# Patient Record
Sex: FEMALE | Race: WHITE | Employment: PART TIME | URBAN - METROPOLITAN AREA
[De-identification: names, ages, dates, MRNs, and addresses within clinical notes are randomized per-mention and may not be internally consistent; named-entity substitution may affect disease eponyms.]

---

## 2019-01-10 ENCOUNTER — OFFICE VISIT (OUTPATIENT)
Dept: FAMILY MEDICINE CLINIC | Age: 39
End: 2019-01-10
Payer: COMMERCIAL

## 2019-01-10 VITALS
WEIGHT: 158.4 LBS | DIASTOLIC BLOOD PRESSURE: 74 MMHG | HEIGHT: 68 IN | HEART RATE: 60 BPM | BODY MASS INDEX: 24.01 KG/M2 | SYSTOLIC BLOOD PRESSURE: 108 MMHG | OXYGEN SATURATION: 97 %

## 2019-01-10 DIAGNOSIS — Z00.00 ANNUAL PHYSICAL EXAM: Primary | ICD-10-CM

## 2019-01-10 PROCEDURE — 99385 PREV VISIT NEW AGE 18-39: CPT | Performed by: FAMILY MEDICINE

## 2019-01-10 ASSESSMENT — PATIENT HEALTH QUESTIONNAIRE - PHQ9
1. LITTLE INTEREST OR PLEASURE IN DOING THINGS: 0
SUM OF ALL RESPONSES TO PHQ9 QUESTIONS 1 & 2: 0
SUM OF ALL RESPONSES TO PHQ QUESTIONS 1-9: 0
2. FEELING DOWN, DEPRESSED OR HOPELESS: 0
SUM OF ALL RESPONSES TO PHQ QUESTIONS 1-9: 0

## 2019-01-10 ASSESSMENT — ENCOUNTER SYMPTOMS
RHINORRHEA: 0
BLOOD IN STOOL: 0
SINUS PRESSURE: 0
SHORTNESS OF BREATH: 0
CHEST TIGHTNESS: 0
EYE PAIN: 0
COLOR CHANGE: 0
DIARRHEA: 0
NAUSEA: 0
WHEEZING: 0
EYE REDNESS: 0
ABDOMINAL PAIN: 0
VOMITING: 0
COUGH: 0
CONSTIPATION: 0
EYE DISCHARGE: 0

## 2019-03-07 ENCOUNTER — NURSE ONLY (OUTPATIENT)
Dept: FAMILY MEDICINE CLINIC | Age: 39
End: 2019-03-07
Payer: COMMERCIAL

## 2019-03-07 DIAGNOSIS — D64.9 ANEMIA, UNSPECIFIED TYPE: Primary | ICD-10-CM

## 2019-03-07 DIAGNOSIS — Z00.00 ANNUAL PHYSICAL EXAM: ICD-10-CM

## 2019-03-07 LAB
A/G RATIO: 1.7 (ref 1.1–2.2)
ALBUMIN SERPL-MCNC: 4.3 G/DL (ref 3.4–5)
ALP BLD-CCNC: 58 U/L (ref 40–129)
ALT SERPL-CCNC: 13 U/L (ref 10–40)
ANION GAP SERPL CALCULATED.3IONS-SCNC: 12 MMOL/L (ref 3–16)
AST SERPL-CCNC: 18 U/L (ref 15–37)
BASOPHILS ABSOLUTE: 0.1 K/UL (ref 0–0.2)
BASOPHILS RELATIVE PERCENT: 2.3 %
BILIRUB SERPL-MCNC: 0.3 MG/DL (ref 0–1)
BUN BLDV-MCNC: 13 MG/DL (ref 7–20)
CALCIUM SERPL-MCNC: 8.8 MG/DL (ref 8.3–10.6)
CHLORIDE BLD-SCNC: 104 MMOL/L (ref 99–110)
CHOLESTEROL, TOTAL: 133 MG/DL (ref 0–199)
CO2: 25 MMOL/L (ref 21–32)
CREAT SERPL-MCNC: 0.7 MG/DL (ref 0.6–1.1)
EOSINOPHILS ABSOLUTE: 0.1 K/UL (ref 0–0.6)
EOSINOPHILS RELATIVE PERCENT: 2.5 %
GFR AFRICAN AMERICAN: >60
GFR NON-AFRICAN AMERICAN: >60
GLOBULIN: 2.5 G/DL
GLUCOSE BLD-MCNC: 79 MG/DL (ref 70–99)
HCT VFR BLD CALC: 29.3 % (ref 36–48)
HDLC SERPL-MCNC: 52 MG/DL (ref 40–60)
HEMOGLOBIN: 9.4 G/DL (ref 12–16)
LDL CHOLESTEROL CALCULATED: 71 MG/DL
LYMPHOCYTES ABSOLUTE: 1.6 K/UL (ref 1–5.1)
LYMPHOCYTES RELATIVE PERCENT: 31 %
MCH RBC QN AUTO: 26.4 PG (ref 26–34)
MCHC RBC AUTO-ENTMCNC: 32.2 G/DL (ref 31–36)
MCV RBC AUTO: 81.7 FL (ref 80–100)
MONOCYTES ABSOLUTE: 0.5 K/UL (ref 0–1.3)
MONOCYTES RELATIVE PERCENT: 9.2 %
NEUTROPHILS ABSOLUTE: 2.8 K/UL (ref 1.7–7.7)
NEUTROPHILS RELATIVE PERCENT: 55 %
PDW BLD-RTO: 13.6 % (ref 12.4–15.4)
PLATELET # BLD: 356 K/UL (ref 135–450)
PMV BLD AUTO: 8.8 FL (ref 5–10.5)
POTASSIUM SERPL-SCNC: 4 MMOL/L (ref 3.5–5.1)
RBC # BLD: 3.58 M/UL (ref 4–5.2)
SODIUM BLD-SCNC: 141 MMOL/L (ref 136–145)
TOTAL PROTEIN: 6.8 G/DL (ref 6.4–8.2)
TRIGL SERPL-MCNC: 51 MG/DL (ref 0–150)
TSH REFLEX: 3.49 UIU/ML (ref 0.27–4.2)
VLDLC SERPL CALC-MCNC: 10 MG/DL
WBC # BLD: 5 K/UL (ref 4–11)

## 2019-03-07 PROCEDURE — 36415 COLL VENOUS BLD VENIPUNCTURE: CPT | Performed by: FAMILY MEDICINE

## 2019-03-08 LAB
FERRITIN: 4.2 NG/ML (ref 15–150)
IRON SATURATION: 7 % (ref 15–50)
IRON: 27 UG/DL (ref 37–145)
TOTAL IRON BINDING CAPACITY: 371 UG/DL (ref 260–445)

## 2019-03-14 ENCOUNTER — OFFICE VISIT (OUTPATIENT)
Dept: FAMILY MEDICINE CLINIC | Age: 39
End: 2019-03-14
Payer: COMMERCIAL

## 2019-03-14 VITALS
HEIGHT: 68 IN | OXYGEN SATURATION: 98 % | DIASTOLIC BLOOD PRESSURE: 62 MMHG | SYSTOLIC BLOOD PRESSURE: 120 MMHG | WEIGHT: 158.6 LBS | BODY MASS INDEX: 24.04 KG/M2 | HEART RATE: 65 BPM

## 2019-03-14 DIAGNOSIS — N92.0 MENORRHAGIA WITH REGULAR CYCLE: ICD-10-CM

## 2019-03-14 DIAGNOSIS — D50.9 IRON DEFICIENCY ANEMIA, UNSPECIFIED IRON DEFICIENCY ANEMIA TYPE: Primary | ICD-10-CM

## 2019-03-14 PROCEDURE — 99213 OFFICE O/P EST LOW 20 MIN: CPT | Performed by: FAMILY MEDICINE

## 2019-03-14 RX ORDER — LANOLIN ALCOHOL/MO/W.PET/CERES
325 CREAM (GRAM) TOPICAL
Qty: 180 TABLET | Refills: 1 | Status: SHIPPED | OUTPATIENT
Start: 2019-03-14 | End: 2020-11-17

## 2019-06-18 ENCOUNTER — NURSE ONLY (OUTPATIENT)
Dept: FAMILY MEDICINE CLINIC | Age: 39
End: 2019-06-18
Payer: COMMERCIAL

## 2019-06-18 DIAGNOSIS — D50.9 IRON DEFICIENCY ANEMIA, UNSPECIFIED IRON DEFICIENCY ANEMIA TYPE: Primary | ICD-10-CM

## 2019-06-18 LAB
BASOPHILS ABSOLUTE: 0.1 K/UL (ref 0–0.2)
BASOPHILS RELATIVE PERCENT: 0.9 %
EOSINOPHILS ABSOLUTE: 0.1 K/UL (ref 0–0.6)
EOSINOPHILS RELATIVE PERCENT: 1.3 %
FERRITIN: 25.4 NG/ML (ref 15–150)
HCT VFR BLD CALC: 36.7 % (ref 36–48)
HEMOGLOBIN: 12.1 G/DL (ref 12–16)
IRON SATURATION: 30 % (ref 15–50)
IRON: 120 UG/DL (ref 37–145)
LYMPHOCYTES ABSOLUTE: 2.1 K/UL (ref 1–5.1)
LYMPHOCYTES RELATIVE PERCENT: 23 %
MCH RBC QN AUTO: 28 PG (ref 26–34)
MCHC RBC AUTO-ENTMCNC: 33.1 G/DL (ref 31–36)
MCV RBC AUTO: 84.5 FL (ref 80–100)
MONOCYTES ABSOLUTE: 0.6 K/UL (ref 0–1.3)
MONOCYTES RELATIVE PERCENT: 6.1 %
NEUTROPHILS ABSOLUTE: 6.3 K/UL (ref 1.7–7.7)
NEUTROPHILS RELATIVE PERCENT: 68.7 %
PDW BLD-RTO: 14.8 % (ref 12.4–15.4)
PLATELET # BLD: 359 K/UL (ref 135–450)
PMV BLD AUTO: 9.4 FL (ref 5–10.5)
RBC # BLD: 4.34 M/UL (ref 4–5.2)
TOTAL IRON BINDING CAPACITY: 398 UG/DL (ref 260–445)
WBC # BLD: 9.1 K/UL (ref 4–11)

## 2019-06-18 PROCEDURE — 36415 COLL VENOUS BLD VENIPUNCTURE: CPT | Performed by: FAMILY MEDICINE

## 2019-06-24 NOTE — RESULT ENCOUNTER NOTE
Patient was advised of results and voiced understanding. Advised to continue taking the iron at same dose. She wanted to let Dr. Bruce Underwood know that she saw Dr. Kristin Pruitt her OB/GYN and he too was concerned with her deficiency so he did an US to check and this came back normal so he started her on a birthcontrol to help regulate her cycles and hopefully they will not be as heavy.

## 2020-11-07 ENCOUNTER — APPOINTMENT (OUTPATIENT)
Dept: GENERAL RADIOLOGY | Age: 40
End: 2020-11-07
Payer: COMMERCIAL

## 2020-11-07 ENCOUNTER — HOSPITAL ENCOUNTER (EMERGENCY)
Age: 40
Discharge: HOME | End: 2020-11-07
Payer: COMMERCIAL

## 2020-11-07 VITALS
OXYGEN SATURATION: 99 % | HEART RATE: 55 BPM | SYSTOLIC BLOOD PRESSURE: 108 MMHG | WEIGHT: 155 LBS | RESPIRATION RATE: 15 BRPM | TEMPERATURE: 98.3 F | DIASTOLIC BLOOD PRESSURE: 65 MMHG | BODY MASS INDEX: 23.35 KG/M2

## 2020-11-07 PROCEDURE — 6370000000 HC RX 637 (ALT 250 FOR IP): Performed by: NURSE PRACTITIONER

## 2020-11-07 PROCEDURE — 99283 EMERGENCY DEPT VISIT LOW MDM: CPT

## 2020-11-07 PROCEDURE — 90715 TDAP VACCINE 7 YRS/> IM: CPT | Performed by: NURSE PRACTITIONER

## 2020-11-07 PROCEDURE — 90471 IMMUNIZATION ADMIN: CPT | Performed by: NURSE PRACTITIONER

## 2020-11-07 PROCEDURE — 73130 X-RAY EXAM OF HAND: CPT

## 2020-11-07 PROCEDURE — 73562 X-RAY EXAM OF KNEE 3: CPT

## 2020-11-07 PROCEDURE — 12002 RPR S/N/AX/GEN/TRNK2.6-7.5CM: CPT

## 2020-11-07 PROCEDURE — 6360000002 HC RX W HCPCS: Performed by: NURSE PRACTITIONER

## 2020-11-07 RX ORDER — TRAMADOL HYDROCHLORIDE 50 MG/1
50 TABLET ORAL ONCE
Status: COMPLETED | OUTPATIENT
Start: 2020-11-07 | End: 2020-11-07

## 2020-11-07 RX ADMIN — TETANUS TOXOID, REDUCED DIPHTHERIA TOXOID AND ACELLULAR PERTUSSIS VACCINE, ADSORBED 0.5 ML: 5; 2.5; 8; 8; 2.5 SUSPENSION INTRAMUSCULAR at 09:58

## 2020-11-07 RX ADMIN — TRAMADOL HYDROCHLORIDE 50 MG: 50 TABLET ORAL at 09:58

## 2020-11-07 ASSESSMENT — PAIN DESCRIPTION - LOCATION: LOCATION: KNEE;FINGER (COMMENT WHICH ONE)

## 2020-11-07 ASSESSMENT — PAIN SCALES - GENERAL
PAINLEVEL_OUTOF10: 8
PAINLEVEL_OUTOF10: 8

## 2020-11-07 ASSESSMENT — PAIN DESCRIPTION - PAIN TYPE: TYPE: ACUTE PAIN

## 2020-11-07 NOTE — ED NOTES
Wound care performed on hand, knee using sterile techinque. Wounds cleaned with NS, 4x4 and hibiclens. Hand wounds stopped bleeding prior to arrival, will apply antibiotic ointment to hand/ knee after Vone stitches knee up.       [facility]  11/07/20 1004

## 2020-11-07 NOTE — ED NOTES
Completed wound care to pt's right knee. Applied Polysporin, Adaptic, gauze 4 by4 pads, gauze stretch wrap, and 2 inch Coban to effected leg. Pt tolerated well and acknowledged all care taking instructions.      Emery Jones  11/07/20 0285

## 2020-11-07 NOTE — ED PROVIDER NOTES
[facility] Emergency Department    CHIEF COMPLAINT  Fall (FELL WHILE JOGGING); Hand Injury; and Knee Pain      SHARED SERVICE VISIT:  Evaluated by MINDI. My supervising physician was available for consultation. HISTORY OF PRESENT ILLNESS  Nasra Goss is a 35 y.o. female who presents to the ED complaining of mechanical /trip and fall while jogging with injuries to her RRF and right knee. Pain in her RRF is \"throbbing\" 6/10 and her right knee pain is \"stinging\" 6/10. There is a laceration on her right knee. She denies the use of blood thinners, HI or LOC. She was able to get up and ambulate immediately following fall. She ambulated into the department. Unsure of tetanus status. No other complaints, modifying factors or associated symptoms. Nursing notes reviewed. Past Medical History:   Diagnosis Date    Broken arm 08/2011    broke left arm at elbow; tripped over chair at pool    History of shingles 04/2018     Past Surgical History:   Procedure Laterality Date    APPENDECTOMY  1999     Family History   Problem Relation Age of Onset    Cancer Father         prostate    Heart Disease Maternal Grandmother     Diabetes Maternal Grandfather     Heart Disease Maternal Grandfather     Cancer Paternal Grandfather         ? ? type     Social History     Socioeconomic History    Marital status:      Spouse name: Not on file    Number of children: Not on file    Years of education: Not on file    Highest education level: Not on file   Occupational History    Not on file   Social Needs    Financial resource strain: Not on file    Food insecurity     Worry: Not on file     Inability: Not on file    Transportation needs     Medical: Not on file     Non-medical: Not on file   Tobacco Use    Smoking status: Never Smoker    Smokeless tobacco: Never Used   Substance and Sexual Activity    Alcohol use: Yes     Comment: occ    Drug use: No    Sexual activity: Yes Partners: Male   Lifestyle    Physical activity     Days per week: Not on file     Minutes per session: Not on file    Stress: Not on file   Relationships    Social connections     Talks on phone: Not on file     Gets together: Not on file     Attends [employer] service: Not on file     Active member of club or organization: Not on file     Attends meetings of clubs or organizations: Not on file     Relationship status: Not on file    Intimate partner violence     Fear of current or ex partner: Not on file     Emotionally abused: Not on file     Physically abused: Not on file     Forced sexual activity: Not on file   Other Topics Concern    Not on file   Social History Narrative    Not on file     No current facility-administered medications for this encounter. Current Outpatient Medications   Medication Sig Dispense Refill    Probiotic Product (PROBIOTIC DAILY) CAPS Take by mouth daily (with breakfast)      multivitamin (THERAGRAN) per tablet Take 1 tablet by mouth daily.  ferrous sulfate 325 (65 Fe) MG EC tablet Take 1 tablet by mouth 3 times daily (with meals) 180 tablet 1     Allergies   Allergen Reactions    Ceclor [Cefaclor] Hives       REVIEW OF SYSTEMS  6 systems reviewed, pertinent positives per HPI otherwise noted to be negative    PHYSICAL EXAM  /65   Pulse 55   Temp 98.3 °F (36.8 °C) (Oral)   Resp 15   Wt 155 lb (70.3 kg)   SpO2 99%   BMI 23.35 kg/m²   GENERAL APPEARANCE: Awake and alert. Cooperative. No acute distress. HEAD: Normocephalic. Atraumatic. No Foster's sign. EYES: PERRL. EOM's grossly intact. ENT: Mucous membranes are moist.   NECK: Supple. Normal ROM. There is no cervical spine tenderness upon palpation. CHEST: Equal symmetric chest rise. LUNGS: Breathing is unlabored. Speaking comfortably in full sentences. Abdomen: Nondistended  Back: There is no thoracic or lumbar spine tenderness upon palpation. EXTREMITIES: MAEE. No acute deformities.  + full ROM of right knee with > 90 degrees of flexion and full extension. There is no crepitus. + DP and PT pulses equal bilaterally. + brisk cap refill < 2 secs. + abrasion noted to dorsum of  bilateral hands. + edema noted to RRF. + full ROM noted to digit. + equal radial pulses bilaterally. No obvious deformity noted to RRF or to right knee. Bilateral upper and lower extremities are warm, pink, dry-well perfused. SKIN: Warm and dry.  + 3.5 cm chevron shaped laceration noted to skin of anterior aspect of right knee. NEUROLOGICAL: Alert and oriented. Strength is 5/5 in all extremities and sensation is intact. RADIOLOGY  Xr Hand Right (min 3 Views)    Result Date: 11/7/2020  EXAMINATION: THREE XRAY VIEWS OF THE RIGHT HAND 11/7/2020 9:41 am COMPARISON: None. HISTORY: ORDERING SYSTEM PROVIDED HISTORY: [facility] fall with injury TECHNOLOGIST PROVIDED HISTORY: Reason for exam:->[facility] fall with injury Reason for Exam:  fell when jogging - pain in right index finger and right patella Acuity: Acute Type of Exam: Initial FINDINGS: No acute fracture or dislocation. No significant arthritic changes. No focal soft tissue abnormality. No acute osseous abnormality of the right hand. Xr Knee Right (3 Views)    Result Date: 11/7/2020  EXAMINATION: THREE XRAY VIEWS OF THE RIGHT KNEE 11/7/2020 9:41 am COMPARISON: None. HISTORY: ORDERING SYSTEM PROVIDED HISTORY: [facility] fall c injury TECHNOLOGIST PROVIDED HISTORY: Reason for exam:->[facility] fall c injury Reason for Exam:  fell when jogging - pain in right index finger and right patella Acuity: Acute Type of Exam: Initial FINDINGS: No acute fracture or dislocation. Joint spaces are preserved with no significant arthritic changes. No joint effusion or focal soft tissue abnormality. No acute osseous abnormality of the right knee. ED COURSE   Patient received tramadol for pain, with good relief. A dressing was applied to right knee.     PROCEDURE:  LACERATION REPAIR  Yusra GONZALES Ruddy or their surrogate had an opportunity to ask questions, and the risks, benefits, and alternatives were discussed. The wound was prepped and draped to maintain a sterile field. A local anesthetic was used to completely anesthetize the wound. It was copiously irrigated. It was explored to its depth in a bloodless field with no sign of tendon, nerve, or vascular injury. No foreign bodies were identified. The 3.5 cm wound was closed with 4-0 ethilon sutures x 7. There were no complications during the procedure. A discussion was had with Mrs. Fox and her  regarding tetanus, laceration of right knee, and contusion of right ring finger. Risk management discussed and shared decision making had with patient and/or surrogate. All questions were answered. Patient will follow up with her PCP for further evaluation/treatment. Patient will return to ED for new/worsening symptoms. Patient was not sent home with prescriptions. MDM  Patient presents to the emergency department with laceration to right knee and contusion of right finger. Considered fracture dislocation of right ring finger but less likely as no radiographic evidence of fracture/dislocation identified by xray. Considered fracture/dislocation of right patella but less likely as there is no radiographic evidence of fracture/dislocation of right patella. Imaging was obtained of patient's hand/finger and knee to rule out fracture/dislocation. There were no acute osseous findings. Considered tendon involvement of right patella but less likely as there is full ROM/flexion and extension of RLE. Tetanus provided as patient is unsure of tetanus status. Patient is instructed to followup with her PCP in the next 7-10 days for suture removal. Return precautions given for s/s of infection. Patient is agreeable with plan for discharge and follow-up. DISPOSITION  Patient was discharged to home in good condition.             Joan Decker APRN - [facility]  11/08/20 1560 [facility], APRN - [facility]  11/10/20 0115

## 2020-11-17 ENCOUNTER — OFFICE VISIT (OUTPATIENT)
Dept: FAMILY MEDICINE CLINIC | Age: 40
End: 2020-11-17
Payer: COMMERCIAL

## 2020-11-17 VITALS
HEIGHT: 68 IN | WEIGHT: 165.2 LBS | DIASTOLIC BLOOD PRESSURE: 68 MMHG | HEART RATE: 62 BPM | TEMPERATURE: 97.2 F | OXYGEN SATURATION: 99 % | BODY MASS INDEX: 25.04 KG/M2 | SYSTOLIC BLOOD PRESSURE: 118 MMHG

## 2020-11-17 PROCEDURE — 99213 OFFICE O/P EST LOW 20 MIN: CPT | Performed by: FAMILY MEDICINE

## 2020-11-17 ASSESSMENT — PATIENT HEALTH QUESTIONNAIRE - PHQ9
SUM OF ALL RESPONSES TO PHQ9 QUESTIONS 1 & 2: 0
SUM OF ALL RESPONSES TO PHQ QUESTIONS 1-9: 0
2. FEELING DOWN, DEPRESSED OR HOPELESS: 0
SUM OF ALL RESPONSES TO PHQ QUESTIONS 1-9: 0
1. LITTLE INTEREST OR PLEASURE IN DOING THINGS: 0
SUM OF ALL RESPONSES TO PHQ QUESTIONS 1-9: 0

## 2020-11-17 NOTE — PROGRESS NOTES
Subjective:      Patient ID: Chere Bamberger is a 38 y.o. female. HPI  Chief Complaint   Patient presents with    Follow-Up from Hospital     Patient is here for an ER follow up, was seen at [facility] ED on 11/7/20 and treated for a fall where she received stitches.  Suture / Staple Removal     Stitches are located in her right knee. Here for follow-up from emergency room. Had a fall while running. Alonza Maribell forward onto her right knee and right hand. Had a bleeding laceration on kneecap which was sutured. Has been keeping clean and looks like it is healing well. States finger is still swollen and sore but she has full range of motion  Chere Bamberger is a 38 y.o. female with the following history as [facility] in [facility]:  Patient Active Problem List    Diagnosis Date Noted    Menorrhagia with regular cycle 03/14/2019    Iron deficiency anemia 03/14/2019     Current Outpatient Medications   Medication Sig Dispense Refill    Probiotic Product (PROBIOTIC DAILY) CAPS Take by mouth daily (with breakfast)      multivitamin (THERAGRAN) per tablet Take 1 tablet by mouth daily. No current facility-administered medications for this visit. Allergies: Ceclor [cefaclor]  Past Medical History:   Diagnosis Date    Broken arm 08/2011    broke left arm at elbow; tripped over chair at pool    History of shingles 04/2018     Past Surgical History:   Procedure Laterality Date    APPENDECTOMY  1999     Family History   Problem Relation Age of Onset    Cancer Father         prostate    Heart Disease Maternal Grandmother     Diabetes Maternal Grandfather     Heart Disease Maternal Grandfather     Cancer Paternal Grandfather         ? ? type     Social History     Tobacco Use    Smoking status: Never Smoker    Smokeless tobacco: Never Used   Substance Use Topics    Alcohol use: Yes     Comment: occ     Vitals:    11/17/20 1128   BP: 118/68   Pulse: 62   Temp: 97.2 °F (36.2 °C)   TempSrc: Temporal   SpO2: 99%   Weight: 165 lb 3.2 oz (74.9 kg)   Height: 5' 8.31\" (1.735 m)     Body mass index is 24.89 kg/m². Wt Readings from Last 3 Encounters:   11/17/20 165 lb 3.2 oz (74.9 kg)   11/07/20 155 lb (70.3 kg)   03/14/19 158 lb 9.6 oz (71.9 kg)     BP Readings from Last 3 Encounters:   11/17/20 118/68   11/07/20 108/65   03/14/19 120/62      RADIOLOGY  Xr Hand Right (min 3 Views)     Result Date: 11/7/2020  EXAMINATION: THREE XRAY VIEWS OF THE RIGHT HAND 11/7/2020 9:41 am COMPARISON: None. HISTORY: ORDERING SYSTEM PROVIDED HISTORY: [facility] fall with injury TECHNOLOGIST PROVIDED HISTORY: Reason for exam:->[facility] fall with injury Reason for Exam:  fell when jogging - pain in right index finger and right patella Acuity: Acute Type of Exam: Initial FINDINGS: No acute fracture or dislocation. No significant arthritic changes. No focal soft tissue abnormality.      No acute osseous abnormality of the right hand.      Xr Knee Right (3 Views)     Result Date: 11/7/2020  EXAMINATION: THREE XRAY VIEWS OF THE RIGHT KNEE 11/7/2020 9:41 am COMPARISON: None. HISTORY: ORDERING SYSTEM PROVIDED HISTORY: [facility] fall c injury TECHNOLOGIST PROVIDED HISTORY: Reason for exam:->[facility] fall c injury Reason for Exam:  fell when jogging - pain in right index finger and right patella Acuity: Acute Type of Exam: Initial FINDINGS: No acute fracture or dislocation. Joint spaces are preserved with no significant arthritic changes. No joint effusion or focal soft tissue abnormality.      No acute osseous abnormality of the right knee.        Review of Systems   Constitutional: Negative for chills and fever. Respiratory: Negative for chest tightness and shortness of breath. Cardiovascular: Negative for chest pain and palpitations. Musculoskeletal: Positive for arthralgias and joint swelling. Skin: Positive for wound. Neurological: Negative for dizziness, syncope and headaches.        Objective:   Physical Exam  Constitutional: Appearance: Normal appearance. Cardiovascular:      Rate and Rhythm: Normal rate and regular rhythm. Pulmonary:      Effort: Pulmonary effort is normal.      Breath sounds: Normal breath sounds. Musculoskeletal:         General: Swelling and tenderness present. Comments: r hand 4th digit- swelling around PIP joint, can bend and flex   Skin:     General: Skin is warm. Comments: r knee- healed laceration with 7 sutures  No cellulitis or draiange   Neurological:      General: No focal deficit present. Mental Status: She is alert and oriented to person, place, and time. Cranial Nerves: No cranial nerve deficit. Sensory: No sensory deficit. Motor: No weakness.       Coordination: Coordination normal.         Assessment:      r knee lac  r finger strain      Plan:      Review ED record  Consult meds  Sutures removed  discussed wound care          Juarez PHILLIPS DO

## 2020-11-21 ASSESSMENT — ENCOUNTER SYMPTOMS
CHEST TIGHTNESS: 0
SHORTNESS OF BREATH: 0

## 2020-12-01 ENCOUNTER — OFFICE VISIT (OUTPATIENT)
Dept: FAMILY MEDICINE CLINIC | Age: 40
End: 2020-12-01
Payer: COMMERCIAL

## 2020-12-01 VITALS
DIASTOLIC BLOOD PRESSURE: 62 MMHG | WEIGHT: 165.2 LBS | HEIGHT: 68 IN | BODY MASS INDEX: 25.04 KG/M2 | HEART RATE: 61 BPM | TEMPERATURE: 96.5 F | SYSTOLIC BLOOD PRESSURE: 116 MMHG | OXYGEN SATURATION: 99 %

## 2020-12-01 PROBLEM — Z00.00 ANNUAL PHYSICAL EXAM: Status: ACTIVE | Noted: 2020-12-01

## 2020-12-01 LAB
A/G RATIO: 1.8 (ref 1.1–2.2)
ALBUMIN SERPL-MCNC: 4.7 G/DL (ref 3.4–5)
ALP BLD-CCNC: 76 U/L (ref 40–129)
ALT SERPL-CCNC: 19 U/L (ref 10–40)
ANION GAP SERPL CALCULATED.3IONS-SCNC: 11 MMOL/L (ref 3–16)
AST SERPL-CCNC: 23 U/L (ref 15–37)
BILIRUB SERPL-MCNC: <0.2 MG/DL (ref 0–1)
BUN BLDV-MCNC: 12 MG/DL (ref 7–20)
CALCIUM SERPL-MCNC: 9.6 MG/DL (ref 8.3–10.6)
CHLORIDE BLD-SCNC: 102 MMOL/L (ref 99–110)
CHOLESTEROL, TOTAL: 152 MG/DL (ref 0–199)
CO2: 27 MMOL/L (ref 21–32)
CREAT SERPL-MCNC: 0.6 MG/DL (ref 0.6–1.1)
GFR AFRICAN AMERICAN: >60
GFR NON-AFRICAN AMERICAN: >60
GLOBULIN: 2.6 G/DL
GLUCOSE BLD-MCNC: 79 MG/DL (ref 70–99)
HCT VFR BLD CALC: 33.9 % (ref 36–48)
HDLC SERPL-MCNC: 56 MG/DL (ref 40–60)
HEMOGLOBIN: 11.2 G/DL (ref 12–16)
LDL CHOLESTEROL CALCULATED: 82 MG/DL
MCH RBC QN AUTO: 28.2 PG (ref 26–34)
MCHC RBC AUTO-ENTMCNC: 33.2 G/DL (ref 31–36)
MCV RBC AUTO: 85.1 FL (ref 80–100)
PDW BLD-RTO: 12.5 % (ref 12.4–15.4)
PLATELET # BLD: 409 K/UL (ref 135–450)
PMV BLD AUTO: 8.6 FL (ref 5–10.5)
POTASSIUM SERPL-SCNC: 4.3 MMOL/L (ref 3.5–5.1)
RBC # BLD: 3.98 M/UL (ref 4–5.2)
SODIUM BLD-SCNC: 140 MMOL/L (ref 136–145)
TOTAL PROTEIN: 7.3 G/DL (ref 6.4–8.2)
TRIGL SERPL-MCNC: 70 MG/DL (ref 0–150)
VLDLC SERPL CALC-MCNC: 14 MG/DL
WBC # BLD: 6.3 K/UL (ref 4–11)

## 2020-12-01 PROCEDURE — 99396 PREV VISIT EST AGE 40-64: CPT | Performed by: FAMILY MEDICINE

## 2020-12-01 PROCEDURE — 36415 COLL VENOUS BLD VENIPUNCTURE: CPT | Performed by: FAMILY MEDICINE

## 2020-12-01 ASSESSMENT — ENCOUNTER SYMPTOMS
VOMITING: 0
EYE REDNESS: 0
EYE PAIN: 0
DIARRHEA: 0
EYE DISCHARGE: 0
CONSTIPATION: 0
SHORTNESS OF BREATH: 0
CHEST TIGHTNESS: 0
COLOR CHANGE: 0
WHEEZING: 0
NAUSEA: 0
SINUS PRESSURE: 0
RHINORRHEA: 0
COUGH: 0
ABDOMINAL PAIN: 0
BLOOD IN STOOL: 0

## 2020-12-01 ASSESSMENT — PATIENT HEALTH QUESTIONNAIRE - PHQ9
SUM OF ALL RESPONSES TO PHQ QUESTIONS 1-9: 0
SUM OF ALL RESPONSES TO PHQ9 QUESTIONS 1 & 2: 0
SUM OF ALL RESPONSES TO PHQ QUESTIONS 1-9: 0
2. FEELING DOWN, DEPRESSED OR HOPELESS: 0
SUM OF ALL RESPONSES TO PHQ QUESTIONS 1-9: 0
1. LITTLE INTEREST OR PLEASURE IN DOING THINGS: 0

## 2020-12-01 NOTE — PROGRESS NOTES
Subjective:      Patient ID: Garrett Hashimoto is a 38 y.o. female. HPI  Chief Complaint   Patient presents with    Annual Exam     Patient is here for a routine physical, she is fasting for blood work. Here for CPE. Non smoker. PMH unremarkable  Keeps up-to-date with dental and vision exams. Does try to exercise frequently. Denies any current problems. Denies any family history of breast or colon cancer. [address]  for her gynecological exams  Garrett Hashimoto is a 38 y.o. female with the following history as recorded in [facility]:  Patient Active Problem List    Diagnosis Date Noted    Menorrhagia with regular cycle 03/14/2019    Iron deficiency anemia 03/14/2019     Current Outpatient Medications   Medication Sig Dispense Refill    Probiotic Product (PROBIOTIC DAILY) CAPS Take by mouth daily (with breakfast)      multivitamin (THERAGRAN) per tablet Take 1 tablet by mouth daily. No current facility-administered medications for this visit. Allergies: Ceclor [cefaclor]  Past Medical History:   Diagnosis Date    Broken arm 08/2011    broke left arm at elbow; tripped over chair at pool    History of shingles 04/2018     Past Surgical History:   Procedure Laterality Date    APPENDECTOMY  1999     Family History   Problem Relation Age of Onset    Cancer Father         prostate    Heart Disease Maternal Grandmother     Diabetes Maternal Grandfather     Heart Disease Maternal Grandfather     Cancer Paternal Grandfather         ? ? type     Social History     Tobacco Use    Smoking status: Never Smoker    Smokeless tobacco: Never Used   Substance Use Topics    Alcohol use: Yes     Comment: occ     Vitals:    12/01/20 0810   BP: 116/62   Pulse: 61   Temp: 96.5 °F (35.8 °C)   TempSrc: Temporal   SpO2: 99%   Weight: 165 lb 3.2 oz (74.9 kg)   Height: 5' 8.31\" (1.735 m)     Body mass index is 24.89 kg/m².      Wt Readings from Last 3 Encounters:   12/01/20 165 lb 3.2 oz (74.9 kg)   11/17/20 165 lb 3.2 oz (74.9 kg)   11/07/20 155 lb (70.3 kg)     BP Readings from Last 3 Encounters:   12/01/20 116/62   11/17/20 118/68   11/07/20 108/65        Review of Systems   Constitutional: Negative for chills, fatigue, fever and unexpected weight change. HENT: Negative for ear discharge, ear pain, hearing loss, rhinorrhea, sinus pressure and tinnitus. Eyes: Negative for pain, discharge, redness and visual disturbance. Respiratory: Negative for cough, chest tightness, shortness of breath and wheezing. Cardiovascular: Negative for chest pain and palpitations. Gastrointestinal: Negative for abdominal pain, blood in stool, constipation, diarrhea, nausea and vomiting. Genitourinary: Negative for difficulty urinating, dysuria and hematuria. Musculoskeletal: Negative for arthralgias and myalgias. Skin: Negative for color change and rash. Neurological: Negative for dizziness, seizures, syncope and headaches. Hematological: Negative for adenopathy. Does not bruise/bleed easily. Psychiatric/Behavioral: Negative for dysphoric mood and sleep disturbance. The patient is not nervous/anxious. Objective:   Physical Exam  Constitutional:       General: She is not in acute distress. Appearance: Normal appearance. She is well-developed. HENT:      Head: Normocephalic. Right Ear: Tympanic membrane, ear canal and external ear normal.      Left Ear: Tympanic membrane, ear canal and external ear normal.      Nose: Nose normal. No congestion or rhinorrhea. Mouth/Throat:      Mouth: Mucous membranes are moist.      Pharynx: Oropharynx is clear. No oropharyngeal exudate. Eyes:      General:         Right eye: No discharge. Left eye: No discharge. Extraocular Movements: Extraocular movements intact. Conjunctiva/sclera: Conjunctivae normal.      Pupils: Pupils are equal, round, and reactive to light. Neck:      Musculoskeletal: Neck supple. No muscular tenderness.       Thyroid: No thyromegaly. Cardiovascular:      Rate and Rhythm: Normal rate and regular rhythm. Heart sounds: Normal heart sounds. No murmur. Pulmonary:      Effort: Pulmonary effort is normal.      Breath sounds: Normal breath sounds. No wheezing or rhonchi. Abdominal:      General: Bowel sounds are normal. There is no distension. Palpations: Abdomen is soft. Tenderness: There is no abdominal tenderness. There is no guarding or rebound. Musculoskeletal: Normal range of motion. General: No swelling, tenderness or deformity. Lymphadenopathy:      Cervical: No cervical adenopathy. Skin:     General: Skin is warm. Coloration: Skin is not jaundiced. Findings: No bruising, erythema or rash. Neurological:      General: No focal deficit present. Mental Status: She is alert and oriented to person, place, and time. Mental status is at baseline. Cranial Nerves: No cranial nerve deficit. Motor: No abnormal muscle tone. Coordination: Coordination normal.      Deep Tendon Reflexes: Reflexes normal.   Psychiatric:         Mood and Affect: Mood normal.         Behavior: Behavior normal.         Thought Content: Thought content normal.         Judgment: Judgment normal.         Assessment:      cpe      Plan:      Patient Counseling:  --Nutrition: Stressed importance of moderation in sodium/caffeine intake, saturated fat and cholesterol, caloric balance, sufficient intake of fresh fruits, vegetables, fiber, calcium, iron, and 1 mg of folate supplement per day (for females capable of pregnancy). --Exercise: Stressed the importance of regular exercise. --Dental health: Discussed importance of regular tooth brushing, flossing, and dental visits. --Immunizations reviewed. Daily sunscreen recommended. Yearly FSE discussed  --Discussed benefits of screening colonoscopy.     Orders Placed This Encounter   Procedures    LIPID PANEL     Order Specific Question:   Is Patient Fasting?/# of Hours     Answer:   Yordy [address]    [facility]             MAIRLU Schneider [address] [facility], DO

## 2020-12-01 NOTE — PATIENT INSTRUCTIONS

## 2020-12-31 PROBLEM — Z00.00 ANNUAL PHYSICAL EXAM: Status: RESOLVED | Noted: 2020-12-01 | Resolved: 2020-12-31

## 2021-03-11 NOTE — PROGRESS NOTES
Henry Joseph    Age 38 y.o.    female    1980    MRN [id number]    4/22/2021  Arrival Time_____________  OR Time____________[address]     Procedure(s):  DILATATION AND CURETTAGE, HYSTEROSCOPY WITH NOVASURE ENDOMETRIAL ABLATION                      General     Surgeon(s):  Harvinder Hodges, MD      DAY ADMIT ___  SDS/OP ___  OUTPT IN BED ___         Phone [phone] (home) [phone] (work)   PCP _____________________ Phone_________________ [address] ( ) Epic CE ( ) Appt ________    ADDITIONAL INFO __________________________________ Cardio/Consult _____________    NOTES _____________________________________________________________________    ____________________________________________________________________________    PAT APPT DATE:________ TIME: ________  FAXED QAD: _______  (__) H&P w/ hospitalist  ____________________________________________________________________________    COVID TEST: Date/Location______________        NURSING HISTORY COMPLETE: _______  (__) CBC       (__) W/ DIFF ___________  (__)  ECHO    __________  (__) Hgb A1C    ___________  (__) CHEST X RAY   __________  (__) LIPID PROFILE  ___________  (__) EKG   __________  (__) PT/PTT   ___________  (__) PFT's   __________  (__) BMP   ___________  (__) CAROTIDS  __________  (__) CMP   ___________  (__) VEIN MAPPING  __________  (__) U/A   ___________  (__) HISTORY & PHYSICAL __________  (__) URINE C & S  ___________  (__) CARDIAC CLEARANCE __________  (__) U/A W/ FLEX  ___________  (__) PULM.  CLEARANCE __________  (__) SERUM PREGNANCY ___________  (__) Check Epic DOS orders __________  (__) TYPE & SCREEN ________ repeat ( ) (__)  __________________ __________  (__) ALBUMIN   ___________  (__)  __________________ __________  (__) TRANSFERRIN  ___________  (__)  __________________ __________  (__) LIVER PROFILE  ___________  (__)  __________________ __________  (__) CARBOXY HGB  ___________  (__) URINE PREG DOS __________  (__) NICOTINE & MET. ___________  (__) BLOOD SUGAR DOS __________  (__) PREALBUMIN  ___________    (__) MRSA NASAL SWAB ___________  (__) BLOOD THINNERS __________  (__) ACE/ ARBS: _____________________    (__) BETABLOCKERS ___________________

## 2021-05-07 ENCOUNTER — OFFICE VISIT (OUTPATIENT)
Dept: PRIMARY CARE CLINIC | Age: 41
End: 2021-05-07
Payer: COMMERCIAL

## 2021-05-07 DIAGNOSIS — Z01.818 PREOP TESTING: Primary | ICD-10-CM

## 2021-05-07 PROCEDURE — 99211 OFF/OP EST MAY X REQ PHY/QHP: CPT | Performed by: NURSE PRACTITIONER

## 2021-05-07 NOTE — PROGRESS NOTES
Preoperative Screening for Elective Surgery/Invasive Procedures While COVID-19 present in the community     Have you had any of the following symptoms? o Fever, chills  o Cough  o Shortness of breath  o Muscle aches/pain  o Diarrhea  o Abdominal pain, nausea, vomiting  o Loss or decrease in taste and / or smell   Risk of Exposure  o Have you recently been hospitalized for COVID-19 or flu-like illness, if so when?  o Recently diagnosed with COVID-19, if so when?  o Recently tested for COVID-19, if so when?  o Have you been in close contact with a person or family member who currently has or recently had COVID-19? If yes, when and in what context?  o Do you live with anybody who in the last 14 days has had fever, chills, shortness of breath, muscle aches, flu-like illness?  o Do you have any close contacts or family members who are currently in the hospital for COVID-19 or flu-like illness? If yes, assess recent close contact with this person. Indicate if the patient has a positive screen by answering yes to one or more of the above questions. Patients who test positive or screen positive prior to surgery or on the day of surgery should be evaluated in conjunction with the surgeon/proceduralist/anesthesiologist to determine the urgency of the procedure.      No to all

## 2021-05-07 NOTE — PATIENT INSTRUCTIONS
Advance Care Planning  People with COVID-19 may have no symptoms, mild symptoms, such as fever, cough, and shortness of breath or they may have more severe illness, developing severe and fatal pneumonia. As a result, Advance Care Planning with attention to naming a health care decision maker (someone you trust to make healthcare decisions for you if you could not speak for yourself) and sharing other health care preferences is important BEFORE a possible health crisis. Please contact your Primary Care Provider to discuss Advance Care Planning. Preventing the Spread of Coronavirus Disease 2019 in Homes and Residential Communities  For the most recent information go to [email or web address]    Prevention steps for People with confirmed or suspected COVID-19 (including persons under investigation) who do not need to be hospitalized  and   People with confirmed COVID-19 who were hospitalized and determined to be medically stable to go home    Your healthcare provider and public health staff will evaluate whether you can be cared for at home. If it is determined that you do not need to be hospitalized and can be isolated at home, you will be monitored by staff from your local or state health department. You should follow the prevention steps below until a healthcare provider or local or state health department says you can return to your normal activities. Stay home except to get medical care  People who are mildly ill with COVID-19 are able to isolate at home during their illness. You should restrict activities outside your home, except for getting medical care. Do not go to work, school, or public areas. Avoid using public transportation, ride-sharing, or taxis. Separate yourself from other people and animals in your home  People: As much as possible, you should stay in a specific room and away from other people in your home.  Also, you should use a separate before eating or preparing food. If soap and water are not readily available, use an alcohol-based hand  with at least 60% alcohol, covering all surfaces of your hands and rubbing them together until they feel dry. Soap and water are the best option if hands are visibly dirty. Avoid touching your eyes, nose, and mouth with unwashed hands. Avoid sharing personal household items  You should not share dishes, drinking glasses, cups, eating utensils, towels, or bedding with other people or pets in your home. After using these items, they should be washed thoroughly with soap and water. Clean all high-touch surfaces everyday  High touch surfaces include counters, tabletops, doorknobs, bathroom fixtures, toilets, phones, keyboards, tablets, and bedside tables. Also, clean any surfaces that may have blood, stool, or body fluids on them. Use a household cleaning spray or wipe, according to the label instructions. Labels contain instructions for safe and effective use of the cleaning product including precautions you should take when applying the product, such as wearing gloves and making sure you have good ventilation during use of the product. Monitor your symptoms  Seek prompt medical attention if your illness is worsening (e.g., difficulty breathing). Before seeking care, call your healthcare provider and tell them that you have, or are being evaluated for, COVID-19. Put on a facemask before you enter the facility. These steps will help the healthcare providers office to keep other people in the office or waiting room from getting infected or exposed. Ask your healthcare provider to call the local or state health department. Persons who are placed under active monitoring or facilitated self-monitoring should follow instructions provided by their local health department or occupational health professionals, as appropriate. When working with your local health department check their available hours.   If you have a medical emergency and need to call 911, notify the dispatch personnel that you have, or are being evaluated for COVID-19. If possible, put on a facemask before emergency medical services arrive. Discontinuing home isolation  Patients with confirmed COVID-19 should remain under home isolation precautions until the risk of secondary transmission to others is thought to be low. The decision to discontinue home isolation precautions should be made on a case-by-case basis, in consultation with healthcare providers and state and local health departments.

## 2021-05-07 NOTE — PROGRESS NOTES
Daryl Carrasco received a viral test for COVID-19. They were educated on isolation and quarantine as appropriate. For any symptoms, they were directed to seek care from their PCP, given contact information to establish with a doctor, directed to an urgent care or the emergency room.

## 2021-05-08 LAB — SARS-COV-2: NOT DETECTED

## 2021-05-13 ENCOUNTER — ANESTHESIA (OUTPATIENT)
Dept: OPERATING ROOM | Age: 41
End: 2021-05-13
Payer: COMMERCIAL

## 2021-05-13 ENCOUNTER — ANESTHESIA EVENT (OUTPATIENT)
Dept: OPERATING ROOM | Age: 41
End: 2021-05-13
Payer: COMMERCIAL

## 2021-05-13 ENCOUNTER — HOSPITAL ENCOUNTER (OUTPATIENT)
Age: 41
Setting detail: OUTPATIENT SURGERY
Discharge: HOME | End: 2021-05-13
Attending: OBSTETRICS & GYNECOLOGY | Admitting: OBSTETRICS & GYNECOLOGY
Payer: COMMERCIAL

## 2021-05-13 VITALS
WEIGHT: 163.4 LBS | HEART RATE: 53 BPM | BODY MASS INDEX: 24.77 KG/M2 | DIASTOLIC BLOOD PRESSURE: 63 MMHG | HEIGHT: 68 IN | SYSTOLIC BLOOD PRESSURE: 114 MMHG | OXYGEN SATURATION: 100 % | RESPIRATION RATE: 16 BRPM | TEMPERATURE: 97.8 F

## 2021-05-13 VITALS
TEMPERATURE: 98.6 F | RESPIRATION RATE: 5 BRPM | SYSTOLIC BLOOD PRESSURE: 98 MMHG | OXYGEN SATURATION: 100 % | DIASTOLIC BLOOD PRESSURE: 53 MMHG

## 2021-05-13 DIAGNOSIS — Z98.890 S/P ENDOMETRIAL ABLATION: Primary | ICD-10-CM

## 2021-05-13 DIAGNOSIS — N92.0 MENORRHAGIA WITH REGULAR CYCLE: ICD-10-CM

## 2021-05-13 LAB — PREGNANCY, URINE: NEGATIVE

## 2021-05-13 PROCEDURE — 6360000002 HC RX W HCPCS: Performed by: OBSTETRICS & GYNECOLOGY

## 2021-05-13 PROCEDURE — 3600000004 HC SURGERY LEVEL 4 BASE: Performed by: OBSTETRICS & GYNECOLOGY

## 2021-05-13 PROCEDURE — 7100000000 HC PACU RECOVERY - FIRST 15 MIN: Performed by: OBSTETRICS & GYNECOLOGY

## 2021-05-13 PROCEDURE — 7100000010 HC PHASE II RECOVERY - FIRST 15 MIN: Performed by: OBSTETRICS & GYNECOLOGY

## 2021-05-13 PROCEDURE — 2580000003 HC RX 258: Performed by: ANESTHESIOLOGY

## 2021-05-13 PROCEDURE — 3700000001 HC ADD 15 MINUTES (ANESTHESIA): Performed by: OBSTETRICS & GYNECOLOGY

## 2021-05-13 PROCEDURE — 6360000002 HC RX W HCPCS: Performed by: NURSE ANESTHETIST, CERTIFIED REGISTERED

## 2021-05-13 PROCEDURE — 6360000002 HC RX W HCPCS: Performed by: ANESTHESIOLOGY

## 2021-05-13 PROCEDURE — 2720000010 HC SURG SUPPLY STERILE: Performed by: OBSTETRICS & GYNECOLOGY

## 2021-05-13 PROCEDURE — 3600000014 HC SURGERY LEVEL 4 ADDTL 15MIN: Performed by: OBSTETRICS & GYNECOLOGY

## 2021-05-13 PROCEDURE — 2709999900 HC NON-CHARGEABLE SUPPLY: Performed by: OBSTETRICS & GYNECOLOGY

## 2021-05-13 PROCEDURE — 88305 TISSUE EXAM BY PATHOLOGIST: CPT

## 2021-05-13 PROCEDURE — 2500000003 HC RX 250 WO HCPCS: Performed by: OBSTETRICS & GYNECOLOGY

## 2021-05-13 PROCEDURE — 7100000011 HC PHASE II RECOVERY - ADDTL 15 MIN: Performed by: OBSTETRICS & GYNECOLOGY

## 2021-05-13 PROCEDURE — 3700000000 HC ANESTHESIA ATTENDED CARE: Performed by: OBSTETRICS & GYNECOLOGY

## 2021-05-13 PROCEDURE — 84703 CHORIONIC GONADOTROPIN ASSAY: CPT

## 2021-05-13 PROCEDURE — 7100000001 HC PACU RECOVERY - ADDTL 15 MIN: Performed by: OBSTETRICS & GYNECOLOGY

## 2021-05-13 PROCEDURE — 2500000003 HC RX 250 WO HCPCS: Performed by: NURSE ANESTHETIST, CERTIFIED REGISTERED

## 2021-05-13 RX ORDER — KETOROLAC TROMETHAMINE 30 MG/ML
INJECTION, SOLUTION INTRAMUSCULAR; INTRAVENOUS PRN
Status: DISCONTINUED | OUTPATIENT
Start: 2021-05-13 | End: 2021-05-13 | Stop reason: SDUPTHER

## 2021-05-13 RX ORDER — FENTANYL CITRATE 50 UG/ML
INJECTION, SOLUTION INTRAMUSCULAR; INTRAVENOUS PRN
Status: DISCONTINUED | OUTPATIENT
Start: 2021-05-13 | End: 2021-05-13 | Stop reason: SDUPTHER

## 2021-05-13 RX ORDER — LEVOFLOXACIN 5 MG/ML
500 INJECTION, SOLUTION INTRAVENOUS ONCE
Status: COMPLETED | OUTPATIENT
Start: 2021-05-13 | End: 2021-05-13

## 2021-05-13 RX ORDER — HYDROCODONE BITARTRATE AND ACETAMINOPHEN 5; 325 MG/1; MG/1
1 TABLET ORAL EVERY 6 HOURS PRN
Qty: 10 TABLET | Refills: 0 | Status: SHIPPED | OUTPATIENT
Start: 2021-05-13 | End: 2021-05-16

## 2021-05-13 RX ORDER — ROCURONIUM BROMIDE 10 MG/ML
INJECTION, SOLUTION INTRAVENOUS PRN
Status: DISCONTINUED | OUTPATIENT
Start: 2021-05-13 | End: 2021-05-13 | Stop reason: SDUPTHER

## 2021-05-13 RX ORDER — MEPERIDINE HYDROCHLORIDE 50 MG/ML
12.5 INJECTION INTRAMUSCULAR; INTRAVENOUS; SUBCUTANEOUS EVERY 5 MIN PRN
Status: DISCONTINUED | OUTPATIENT
Start: 2021-05-13 | End: 2021-05-13 | Stop reason: HOSPADM

## 2021-05-13 RX ORDER — SODIUM CHLORIDE 9 MG/ML
25 INJECTION, SOLUTION INTRAVENOUS PRN
Status: DISCONTINUED | OUTPATIENT
Start: 2021-05-13 | End: 2021-05-13 | Stop reason: HOSPADM

## 2021-05-13 RX ORDER — ONDANSETRON 2 MG/ML
4 INJECTION INTRAMUSCULAR; INTRAVENOUS EVERY 30 MIN PRN
Status: DISCONTINUED | OUTPATIENT
Start: 2021-05-13 | End: 2021-05-13 | Stop reason: HOSPADM

## 2021-05-13 RX ORDER — SODIUM CHLORIDE, SODIUM LACTATE, POTASSIUM CHLORIDE, CALCIUM CHLORIDE 600; 310; 30; 20 MG/100ML; MG/100ML; MG/100ML; MG/100ML
INJECTION, SOLUTION INTRAVENOUS CONTINUOUS
Status: DISCONTINUED | OUTPATIENT
Start: 2021-05-13 | End: 2021-05-13 | Stop reason: HOSPADM

## 2021-05-13 RX ORDER — SODIUM CHLORIDE 0.9 % (FLUSH) 0.9 %
5-40 SYRINGE (ML) INJECTION PRN
Status: DISCONTINUED | OUTPATIENT
Start: 2021-05-13 | End: 2021-05-13 | Stop reason: HOSPADM

## 2021-05-13 RX ORDER — MIDAZOLAM HYDROCHLORIDE 1 MG/ML
INJECTION INTRAMUSCULAR; INTRAVENOUS PRN
Status: DISCONTINUED | OUTPATIENT
Start: 2021-05-13 | End: 2021-05-13 | Stop reason: SDUPTHER

## 2021-05-13 RX ORDER — DIPHENHYDRAMINE HYDROCHLORIDE 50 MG/ML
6.25 INJECTION INTRAMUSCULAR; INTRAVENOUS
Status: DISCONTINUED | OUTPATIENT
Start: 2021-05-13 | End: 2021-05-13 | Stop reason: HOSPADM

## 2021-05-13 RX ORDER — LIDOCAINE HYDROCHLORIDE 10 MG/ML
0.3 INJECTION, SOLUTION EPIDURAL; INFILTRATION; INTRACAUDAL; PERINEURAL
Status: DISCONTINUED | OUTPATIENT
Start: 2021-05-13 | End: 2021-05-13 | Stop reason: HOSPADM

## 2021-05-13 RX ORDER — LABETALOL HYDROCHLORIDE 5 MG/ML
5 INJECTION, SOLUTION INTRAVENOUS
Status: DISCONTINUED | OUTPATIENT
Start: 2021-05-13 | End: 2021-05-13 | Stop reason: HOSPADM

## 2021-05-13 RX ORDER — OXYCODONE HYDROCHLORIDE AND ACETAMINOPHEN 5; 325 MG/1; MG/1
1 TABLET ORAL PRN
Status: DISCONTINUED | OUTPATIENT
Start: 2021-05-13 | End: 2021-05-13 | Stop reason: HOSPADM

## 2021-05-13 RX ORDER — PROPOFOL 10 MG/ML
INJECTION, EMULSION INTRAVENOUS PRN
Status: DISCONTINUED | OUTPATIENT
Start: 2021-05-13 | End: 2021-05-13 | Stop reason: SDUPTHER

## 2021-05-13 RX ORDER — HYDRALAZINE HYDROCHLORIDE 20 MG/ML
5 INJECTION INTRAMUSCULAR; INTRAVENOUS EVERY 30 MIN PRN
Status: DISCONTINUED | OUTPATIENT
Start: 2021-05-13 | End: 2021-05-13 | Stop reason: HOSPADM

## 2021-05-13 RX ORDER — SODIUM CHLORIDE 0.9 % (FLUSH) 0.9 %
5-40 SYRINGE (ML) INJECTION EVERY 12 HOURS SCHEDULED
Status: DISCONTINUED | OUTPATIENT
Start: 2021-05-13 | End: 2021-05-13 | Stop reason: HOSPADM

## 2021-05-13 RX ORDER — OXYCODONE HYDROCHLORIDE AND ACETAMINOPHEN 5; 325 MG/1; MG/1
2 TABLET ORAL PRN
Status: DISCONTINUED | OUTPATIENT
Start: 2021-05-13 | End: 2021-05-13 | Stop reason: HOSPADM

## 2021-05-13 RX ORDER — IBUPROFEN 800 MG/1
800 TABLET ORAL EVERY 6 HOURS PRN
Qty: 30 TABLET | Refills: 1 | Status: SHIPPED | OUTPATIENT
Start: 2021-05-13

## 2021-05-13 RX ORDER — LIDOCAINE HYDROCHLORIDE 20 MG/ML
INJECTION, SOLUTION INFILTRATION; PERINEURAL PRN
Status: DISCONTINUED | OUTPATIENT
Start: 2021-05-13 | End: 2021-05-13 | Stop reason: SDUPTHER

## 2021-05-13 RX ADMIN — LIDOCAINE HYDROCHLORIDE 5 ML: 20 INJECTION, SOLUTION INFILTRATION; PERINEURAL at 12:43

## 2021-05-13 RX ADMIN — SODIUM CHLORIDE, SODIUM LACTATE, POTASSIUM CHLORIDE, AND CALCIUM CHLORIDE: .6; .31; .03; .02 INJECTION, SOLUTION INTRAVENOUS at 13:23

## 2021-05-13 RX ADMIN — KETOROLAC TROMETHAMINE 30 MG: 30 INJECTION, SOLUTION INTRAMUSCULAR; INTRAVENOUS at 13:18

## 2021-05-13 RX ADMIN — FENTANYL CITRATE 50 MCG: 50 INJECTION INTRAMUSCULAR; INTRAVENOUS at 12:49

## 2021-05-13 RX ADMIN — LEVOFLOXACIN 500 MG: 5 INJECTION, SOLUTION INTRAVENOUS at 12:47

## 2021-05-13 RX ADMIN — MIDAZOLAM HYDROCHLORIDE 2 MG: 2 INJECTION, SOLUTION INTRAMUSCULAR; INTRAVENOUS at 12:38

## 2021-05-13 RX ADMIN — HYDROMORPHONE HYDROCHLORIDE 0.5 MG: 1 INJECTION, SOLUTION INTRAMUSCULAR; INTRAVENOUS; SUBCUTANEOUS at 14:01

## 2021-05-13 RX ADMIN — METRONIDAZOLE 500 MG: 500 INJECTION, SOLUTION INTRAVENOUS at 12:36

## 2021-05-13 RX ADMIN — ROCURONIUM BROMIDE 15 MG: 10 SOLUTION INTRAVENOUS at 12:43

## 2021-05-13 RX ADMIN — SODIUM CHLORIDE, SODIUM LACTATE, POTASSIUM CHLORIDE, AND CALCIUM CHLORIDE: .6; .31; .03; .02 INJECTION, SOLUTION INTRAVENOUS at 11:38

## 2021-05-13 RX ADMIN — PROPOFOL 200 MG: 10 INJECTION, EMULSION INTRAVENOUS at 12:43

## 2021-05-13 RX ADMIN — FENTANYL CITRATE 50 MCG: 50 INJECTION INTRAMUSCULAR; INTRAVENOUS at 13:09

## 2021-05-13 ASSESSMENT — PULMONARY FUNCTION TESTS
PIF_VALUE: 1
PIF_VALUE: 11
PIF_VALUE: 11
PIF_VALUE: 12
PIF_VALUE: 10
PIF_VALUE: 6
PIF_VALUE: 6
PIF_VALUE: 12
PIF_VALUE: 11
PIF_VALUE: 12
PIF_VALUE: 11
PIF_VALUE: 5
PIF_VALUE: 1
PIF_VALUE: 11
PIF_VALUE: 1
PIF_VALUE: 6
PIF_VALUE: 11
PIF_VALUE: 3
PIF_VALUE: 0
PIF_VALUE: 11
PIF_VALUE: 11
PIF_VALUE: 0
PIF_VALUE: 11

## 2021-05-13 ASSESSMENT — PAIN DESCRIPTION - PAIN TYPE
TYPE: SURGICAL PAIN
TYPE: SURGICAL PAIN

## 2021-05-13 ASSESSMENT — PAIN DESCRIPTION - LOCATION: LOCATION: ABDOMEN

## 2021-05-13 ASSESSMENT — PAIN DESCRIPTION - DESCRIPTORS: DESCRIPTORS: CRAMPING

## 2021-05-13 ASSESSMENT — PAIN SCALES - GENERAL
PAINLEVEL_OUTOF10: 7
PAINLEVEL_OUTOF10: 0

## 2021-05-13 ASSESSMENT — PAIN - FUNCTIONAL ASSESSMENT: PAIN_FUNCTIONAL_ASSESSMENT: 0-10

## 2021-05-13 NOTE — PROGRESS NOTES
Discharge instructions reviewed with patient and , verbalizes understanding. Patient up ambulating and able to void at this time.  SL removed and dressing placed/

## 2021-05-13 NOTE — OP NOTE
Operative Note      Patient: Shilpa Lewis  YOB: 1980  MRN: [id number]    Date of Procedure: 5/13/2021    Pre-Op Diagnosis: MENORRHAGIA    Post-Op Diagnosis: Same and endometrial polyp       Procedure(s):  DILATATION AND CURETTAGE, HYSTEROSCOPY WITH NOVASURE ENDOMETRIAL ABLATION    Surgeon(s):  Lynsey Bob MD    Assistant:   Surgical Assistant: Sebastian Tony    Anesthesia: General    Estimated Blood Loss (mL): less than 384     Complications: None    Specimens:   ID Type Source Tests Collected by Time Destination   A : endocervical polyp Tissue Tissue SURGICAL PATHOLOGY Lynsey Bob MD 5/13/2021 1301    B : endometrial curettings Tissue Tissue SURGICAL PATHOLOGY Lynsey Bob MD 5/13/2021 1305        Implants:  * No implants in log *      Drains: * No LDAs found *    Findings: 1 cm endometrial polyp; menstrual type endometrium    Detailed Description of Procedure:   See dictated note    Electronically signed by Dontae King MD on 5/13/2021 at 1:29 PM

## 2021-05-13 NOTE — H&P
H&P reviewed and patient examined, no changes noted. I have reviewed the risks, benefits and alternatives to the procedure.   Sanam Anguiano

## 2021-05-13 NOTE — ANESTHESIA PRE PROCEDURE
Department of Anesthesiology  Preprocedure Note       Name:  Jacquelin Mayberry   Age:  38 y.o.  :  1980                                          MRN:  [id number]         Date:  2021      Surgeon: Devaughn Golden):  Uche Johnson MD    Procedure: Procedure(s):  DILATATION AND CURETTAGE, HYSTEROSCOPY WITH NOVASURE ENDOMETRIAL ABLATION    Medications prior to admission:   Prior to Admission medications    Medication Sig Start Date End Date Taking? Authorizing Provider   Ferrous Sulfate (IRON PO) Take by mouth   Yes Historical Provider, MD   Probiotic Product (PROBIOTIC DAILY) CAPS Take by mouth daily (with breakfast)   Yes Historical Provider, MD   multivitamin (THERAGRAN) per tablet Take 1 tablet by mouth daily. Yes Historical Provider, MD       Current medications:    Current Facility-Administered Medications   Medication Dose Route Frequency Provider Last Rate Last Admin    metronidazole (FLAGYL) 500 mg in NaCl 100 mL IVPB premix  500 mg Intravenous Once Uche Johnson MD        levoFLOXacin (LEVAQUIN) 500 MG/100ML infusion 500 mg  500 mg Intravenous Once Uche Johnson MD        lactated ringers infusion   Intravenous Continuous Nando Ortega MD        sodium chloride flush 0.9 % injection 5-40 mL  5-40 mL Intravenous 2 times per day Nando Ortega MD        sodium chloride flush 0.9 % injection 5-40 mL  5-40 mL Intravenous PRN Nando Ortega MD        0.9 % sodium chloride infusion  25 mL Intravenous PRN Nando Ortega MD        lidocaine PF 1 % injection 0.3 mL  0.3 mL Intradermal Once PRN Nando Ortega MD           Allergies:     Allergies   Allergen Reactions    Ceclor [Cefaclor] Hives       Problem List:    Patient Active Problem List   Diagnosis Code    Menorrhagia with regular cycle N92.0    Iron deficiency anemia D50.9       Past Medical History:        Diagnosis Date    Broken arm 2011    broke left arm at elbow; tripped over chair at pool    History of shingles 2018 Past Surgical History:        Procedure Laterality Date    APPENDECTOMY  1999       Social History:    Social History     Tobacco Use    Smoking status: Never Smoker    Smokeless tobacco: Never Used   Substance Use Topics    Alcohol use: Yes     Comment: occ                                Counseling given: Not Answered      Vital Signs (Current):   Vitals:    05/07/21 1007 05/13/21 1120 05/13/21 1144   BP:   115/64   Pulse:   64   Resp:   16   Temp:   98.5 °F (36.9 °C)   TempSrc:   Temporal   SpO2:   100%   Weight: 160 lb (72.6 kg) 163 lb 6.4 oz (74.1 kg)    Height: 5' 8\" (1.727 m) 5' 8\" (1.727 m)                                               BP Readings from Last 3 Encounters:   05/13/21 115/64   12/01/20 116/62   11/17/20 118/68       NPO Status: Time of last liquid consumption: 2130                        Time of last solid consumption: 2130                        Date of last liquid consumption: 05/12/21                        Date of last solid food consumption: 05/12/21    BMI:   Wt Readings from Last 3 Encounters:   05/13/21 163 lb 6.4 oz (74.1 kg)   12/01/20 165 lb 3.2 oz (74.9 kg)   11/17/20 165 lb 3.2 oz (74.9 kg)     Body mass index is 24.84 kg/m².     CBC:   Lab Results   Component Value Date    WBC 6.3 12/01/2020    RBC 3.98 12/01/2020    HGB 11.2 12/01/2020    HCT 33.9 12/01/2020    MCV 85.1 12/01/2020    RDW 12.5 12/01/2020     12/01/2020       CMP:   Lab Results   Component Value Date     12/01/2020    K 4.3 12/01/2020     12/01/2020    CO2 27 12/01/2020    BUN 12 12/01/2020    CREATININE 0.6 12/01/2020    GFRAA >60 12/01/2020    GFRAA >60 02/12/2010    AGRATIO 1.8 12/01/2020    LABGLOM >60 12/01/2020    GLUCOSE 79 12/01/2020    PROT 7.3 12/01/2020    PROT 7.7 02/12/2010    CALCIUM 9.6 12/01/2020    BILITOT <0.2 12/01/2020    ALKPHOS 76 12/01/2020    AST 23 12/01/2020    ALT 19 12/01/2020       POC Tests: No results for input(s): POCGLU, POCNA, POCK, POCCL, POCBUN, POCHEMO, POCHCT in the last 72 hours. Coags: No results found for: PROTIME, INR, APTT    HCG (If Applicable):   Lab Results   Component Value Date    PREGTESTUR Negative 05/13/2021        ABGs: No results found for: PHART, PO2ART, MAX5QRZ, DRZ9UOJ, BEART, B2XXCDSQ     Type & Screen (If Applicable):  No results found for: LABABO, LABRH    Drug/Infectious Status (If Applicable):  No results found for: HIV, HEPCAB    COVID-19 Screening (If Applicable):   Lab Results   Component Value Date    COVID19 Not Detected 05/07/2021           Anesthesia Evaluation  Patient summary reviewed and Nursing notes reviewed no history of anesthetic complications:   Airway: Mallampati: II     Neck ROM: full   Dental:          Pulmonary:                              Cardiovascular:                      Neuro/Psych:               GI/Hepatic/Renal:             Endo/Other:                     Abdominal:           Vascular:                                        Anesthesia Plan      general     ASA 1     (Medications & allergies reviewed  All available lab & EKG data reviewed)  Induction: intravenous. Anesthetic plan and risks discussed with patient. Plan discussed with CRNA.                   Alcon Kathleen MD   5/13/2021

## 2021-05-13 NOTE — ANESTHESIA POSTPROCEDURE EVALUATION
Department of Anesthesiology  Postprocedure Note    Patient: Ba Finnegan  MRN: [id number]  YOB: 1980  Date of evaluation: 5/13/2021  Time:  6:07 PM     Procedure Summary     Date: 05/13/21 Room / Location: [address] [place] [address]    Anesthesia Start: 7027 Anesthesia Stop: 9540    Procedure: DILATATION AND CURETTAGE, HYSTEROSCOPY WITH NOVASURE ENDOMETRIAL ABLATION (N/A Vagina ) Diagnosis:       Menorrhagia with regular cycle      (MENORRHAGIA)    Surgeons: Dre Maher MD Responsible Provider: Shreyas Caceres MD    Anesthesia Type: general ASA Status: 1          Anesthesia Type: general    Floridalma Phase I: Floridalma Score: 9    Floridalma Phase II: Floridalma Score: 10    Last vitals: Reviewed and per EMR flowsheets.        Anesthesia Post Evaluation    Comments: Postoperative Anesthesia Note    Name:    Ba Finnegan  MRN:      [id number]    Patient Vitals in the past 12 hrs:  05/13/21 1452, BP:114/63, Temp:97.8 °F (36.6 °C), Temp src:Temporal, Pulse:53, Resp:16, SpO2:100 %  05/13/21 1445, BP:(!) 113/52, Pulse:(!) 48, Resp:12, SpO2:99 %  05/13/21 1440, BP:115/63, Pulse:(!) 49, Resp:12, SpO2:99 %  05/13/21 1435, BP:(!) 117/49, Pulse:(!) 49, Resp:12, SpO2:98 %  05/13/21 1430, BP:114/62, Pulse:(!) 49, Resp:12, SpO2:98 %  05/13/21 1425, BP:113/61, Pulse:59, Resp:18, SpO2:95 %  05/13/21 1420, BP:(!) 112/53, Pulse:(!) 47, Resp:12, SpO2:98 %  05/13/21 1415, BP:(!) 113/58, Pulse:50, Resp:12, SpO2:99 %  05/13/21 1405, BP:112/62, Pulse:52, Resp:10, SpO2:100 %  05/13/21 1400, BP:(!) 115/50, Pulse:56, Resp:13, SpO2:96 %  05/13/21 1355, BP:(!) 133/49, Pulse:56, Resp:11, SpO2:99 %  05/13/21 1350, BP:(!) 116/57, Pulse:59, Resp:11, SpO2:96 %  05/13/21 1345, BP:(!) 115/58, Pulse:55, Resp:12, SpO2:100 %  05/13/21 1343, Temp:97.1 °F (36.2 °C), Temp src:Temporal  05/13/21 1144, BP:115/64, Temp:98.5 °F (36.9 °C), Temp src:Temporal, Pulse:64, Resp:16, SpO2:100 %  05/13/21 1120, Height:5' 8\" (1.727 m), Weight:163 lb 6.4 oz (74.1 kg)     LABS:    CBC  Lab Results       Component                Value               Date/Time                  WBC                      6.3                 12/01/2020 08:31 AM        HGB                      11.2 (L)            12/01/2020 08:31 AM        HCT                      33.9 (L)            12/01/2020 08:31 AM        PLT                      409                 12/01/2020 08:31 AM   RENAL  Lab Results       Component                Value               Date/Time                  NA                       140                 12/01/2020 08:31 AM        K                        4.3                 12/01/2020 08:31 AM        CL                       102                 12/01/2020 08:31 AM        CO2                      27                  12/01/2020 08:31 AM        BUN                      12                  12/01/2020 08:31 AM        CREATININE               0.6                 12/01/2020 08:31 AM        GLUCOSE                  79                  12/01/2020 08:31 AM   COAGS  No results found for: PROTIME, INR, APTT    Intake & Output: In: 5897 (P.O.:25; I.V.:1200)  Out: 75 (Urine:50)    Nausea & Vomiting:  No    Level of Consciousness:  Awake    Pain Assessment:  Adequate analgesia    Anesthesia Complications:  No apparent anesthetic complications    SUMMARY      Vital signs stable  OK to discharge from Stage I post anesthesia care.   Care transferred from Anesthesiology department on discharge from perioperative area

## 2021-05-13 NOTE — PROGRESS NOTES
Patient ready to prepare for discharge. Bedside report and transfer of care given to Alee VARELA. Anabel Miner

## 2021-05-13 NOTE — PROGRESS NOTES
Patient transferred to [facility] to prepare for discharge.  brought back to room. Patient eating a light snack.

## 2021-05-13 NOTE — PROGRESS NOTES
Patient resting in stretcher with safety features in place and call light in reach.   brought to bedside

## 2021-05-14 NOTE — OP NOTE
[facility]                 Maryanne Tam                                OPERATIVE REPORT    PATIENT NAME: Author Cross                  :        1980  MED REC NO:   [id number]                          ROOM:  ACCOUNT NO:   [de-identified]                           ADMIT DATE: 2021  PROVIDER:     Quintin Spaulding MD    DATE OF PROCEDURE:  2021    PREOPERATIVE DIAGNOSES:  Menorrhagia with regular cycle. POSTOPERATIVE DIAGNOSES:  Menorrhagia with regular cycle with  endometrial polyp. OPERATION PERFORMED:  Dilation and curettage, hysteroscopy with NovaSure  endometrial ablation. SURGEON:  Quintin Spaulding MD    ANESTHESIA:  General with LMA. INDICATIONS AND CONSENT FOR PROCEDURE:  The patient is a 69-year-old   3, para 2, AB 1 female, who presented in 2021 for her yearly  exam.  She complained at that point of heavy menstrual periods that were  regular with small clots. She had taken birth control pills until this  the summer of 2020 for cycle control. However, when she stopped, the  heavy periods resumed. She was interested in definitive therapy with  endometrial ablation. She underwent a Pap smear, which returned  unsatisfactory initially due to obscuring blood. She had a followup Pap  in 2021, which was negative. She then had an endometrial biopsy  done, which showed a benign endometrial polyp. She was aware of risks  of surgery including anesthesia, infection, injury to bowel or urinary  tract, hemorrhage requiring transfusion with risk of HIV or hepatitis  and possible need for further surgery. She understood these risks and  signed informed consent. DESCRIPTION OF PROCEDURE:  With the patient under satisfactory general  anesthesia, she was placed in the dorsal lithotomy position in the  candy-cane stirrups. A careful examination under anesthesia was  performed revealing no abnormalities.   The patient was on her menstrual  period. The vagina and perineum were prepped and draped in the usual  sterile fashion. A catheter was used to empty the bladder. Erickson  retractors were placed anteriorly and posteriorly in the vaginal vault  and the cervix was grasped along its anterior lip with a single tooth  tenaculum. Some small endocervical polyps were noted initially and  these were removed with a polyp forceps. Next, the uterus was sounded  and then the cervical canal was dilated with a series of sequential  Hegar dilators until a #7 Hegar could be introduced into the uterine  cavity. The cavity length was then calculated to be 5 cm. A 0 degree  hysteroscope was then introduced in the uterine cavity and using  lactated Ringer's as the distending medium, pan hysteroscopy was  performed. There was an endometrial polyp present in the lower uterine  segment on the right-hand side. It appeared to be benign. Both tubal  ostia were identified. Next, the NovaSure instrument was introduced in  the uterine cavity. The cavity width was then calculated to be 3.6 cm  resulting in a power setting of 99 garcia. Cavity assessment was passed  and then the treatment cycle was begun and this lasted for 1 minute and  11 seconds. The NovaSure was withdrawn, inspected, and found to be  intact. The hysteroscope was reinserted and the polyp was still  present. Polyp forceps were used to remove this. The tenaculum was  removed. There is some bleeding from both tenaculum sites, so two  sutures of 0 Vicryl were then placed to control this. The patient was  then awakened and extubated, and taken to the recovery room in  satisfactory condition. All sponge, needle, and instrument counts were  correct. There were no complications. Estimated blood loss was less  than 50 mL and fluid replacement was with crystalloid.         Anabel Woodward MD    D: 05/13/2021 14:48:47       T: 05/13/2021 16:55:35     [email or web address]  Job#: [id number] Doc#: [id number]    CC:

## 2021-07-27 ENCOUNTER — OFFICE VISIT (OUTPATIENT)
Dept: FAMILY MEDICINE CLINIC | Age: 41
End: 2021-07-27
Payer: COMMERCIAL

## 2021-07-27 VITALS
TEMPERATURE: 97.4 F | OXYGEN SATURATION: 100 % | SYSTOLIC BLOOD PRESSURE: 114 MMHG | WEIGHT: 165.4 LBS | BODY MASS INDEX: 25.07 KG/M2 | HEART RATE: 57 BPM | DIASTOLIC BLOOD PRESSURE: 78 MMHG | HEIGHT: 68 IN

## 2021-07-27 DIAGNOSIS — N30.00 ACUTE CYSTITIS WITHOUT HEMATURIA: Primary | ICD-10-CM

## 2021-07-27 DIAGNOSIS — R35.0 URINARY FREQUENCY: ICD-10-CM

## 2021-07-27 DIAGNOSIS — R30.0 DYSURIA: ICD-10-CM

## 2021-07-27 LAB
BILIRUBIN, POC: NEGATIVE
BLOOD URINE, POC: NORMAL
CLARITY, POC: NORMAL
COLOR, POC: NORMAL
GLUCOSE URINE, POC: NEGATIVE
KETONES, POC: NEGATIVE
LEUKOCYTE EST, POC: NORMAL
NITRITE, POC: NEGATIVE
PH, POC: 5.5
PROTEIN, POC: NEGATIVE
SPECIFIC GRAVITY, POC: 1.02
UROBILINOGEN, POC: 0.2

## 2021-07-27 PROCEDURE — 81002 URINALYSIS NONAUTO W/O SCOPE: CPT | Performed by: FAMILY MEDICINE

## 2021-07-27 PROCEDURE — 99213 OFFICE O/P EST LOW 20 MIN: CPT | Performed by: FAMILY MEDICINE

## 2021-07-27 RX ORDER — CIPROFLOXACIN 250 MG/1
250 TABLET, FILM COATED ORAL 2 TIMES DAILY
Qty: 6 TABLET | Refills: 0 | Status: SHIPPED | OUTPATIENT
Start: 2021-07-27 | End: 2021-08-06

## 2021-07-27 SDOH — ECONOMIC STABILITY: FOOD INSECURITY: WITHIN THE PAST 12 MONTHS, YOU WORRIED THAT YOUR FOOD WOULD RUN OUT BEFORE YOU GOT MONEY TO BUY MORE.: NEVER TRUE

## 2021-07-27 SDOH — ECONOMIC STABILITY: FOOD INSECURITY: WITHIN THE PAST 12 MONTHS, THE FOOD YOU BOUGHT JUST DIDN'T LAST AND YOU DIDN'T HAVE MONEY TO GET MORE.: NEVER TRUE

## 2021-07-27 ASSESSMENT — PATIENT HEALTH QUESTIONNAIRE - PHQ9
SUM OF ALL RESPONSES TO PHQ QUESTIONS 1-9: 0
1. LITTLE INTEREST OR PLEASURE IN DOING THINGS: 0
SUM OF ALL RESPONSES TO PHQ9 QUESTIONS 1 & 2: 0
SUM OF ALL RESPONSES TO PHQ QUESTIONS 1-9: 0
SUM OF ALL RESPONSES TO PHQ QUESTIONS 1-9: 0
2. FEELING DOWN, DEPRESSED OR HOPELESS: 0

## 2021-07-27 ASSESSMENT — ENCOUNTER SYMPTOMS
ABDOMINAL PAIN: 0
VOMITING: 0
NAUSEA: 0

## 2021-07-27 ASSESSMENT — SOCIAL DETERMINANTS OF HEALTH (SDOH): HOW HARD IS IT FOR YOU TO PAY FOR THE VERY BASICS LIKE FOOD, HOUSING, MEDICAL CARE, AND HEATING?: NOT HARD AT ALL

## 2021-07-27 NOTE — PROGRESS NOTES
Subjective:      Patient ID: April Hardin is a 38 y.o. female. HPI  Chief Complaint   Patient presents with    Dysuria     Patient is here with complaints of urinary frequency, mild abdominal cramping and discomfort when urinating. Symptoms began late Saturday/sunday morning.  Urinary Frequency     Here for c/o burning with urination since sat  No fever or chills  No N/V/D  No vaginal discharge  Due to start period  Denies blood in urine  Vitals:    07/27/21 0841   Temp: 97.4 °F (36.3 °C)   TempSrc: Temporal   Weight: 165 lb 6.4 oz (75 kg)   Height: 5' 8\" (1.727 m)     Body mass index is 25.15 kg/m². Wt Readings from Last 3 Encounters:   07/27/21 165 lb 6.4 oz (75 kg)   05/13/21 163 lb 6.4 oz (74.1 kg)   12/01/20 165 lb 3.2 oz (74.9 kg)     BP Readings from Last 3 Encounters:   05/13/21 114/63   05/13/21 (!) 98/53   12/01/20 116/62        Review of Systems   Constitutional: Negative for chills and fever. Cardiovascular: Negative for chest pain. Gastrointestinal: Negative for abdominal pain, nausea and vomiting. Genitourinary: Positive for dysuria. Negative for difficulty urinating, flank pain and hematuria. Objective:   Physical Exam  Constitutional:       General: She is not in acute distress. Appearance: She is well-developed. Cardiovascular:      Rate and Rhythm: Normal rate. Pulmonary:      Effort: Pulmonary effort is normal.      Breath sounds: Normal breath sounds. Abdominal:      General: Bowel sounds are normal. There is no distension. Palpations: Abdomen is soft. Tenderness: There is no abdominal tenderness. There is no guarding or rebound. Assessment:      UTI- start abx      Plan:      Orders Placed This Encounter   Procedures    Culture, Urine     Order Specific Question:   Specify (ex-cath, midstream, cysto, etc)?      Answer:   midstream    POCT Urinalysis no Micro     Orders Placed This Encounter   Medications    ciprofloxacin (CIPRO) 250 MG tablet     Sig: Take 1 tablet by mouth 2 times daily for 10 days     Dispense:  6 tablet     Refill:  0             MARILU PHILLIPS, DO

## 2021-07-27 NOTE — PATIENT INSTRUCTIONS

## 2021-07-29 LAB
ORGANISM: ABNORMAL
URINE CULTURE, ROUTINE: ABNORMAL

## 2021-10-05 ENCOUNTER — HOSPITAL ENCOUNTER (OUTPATIENT)
Dept: MAMMOGRAPHY | Age: 41
Discharge: HOME | End: 2021-10-10
Payer: COMMERCIAL

## 2021-10-05 VITALS — BODY MASS INDEX: 24.25 KG/M2 | HEIGHT: 68 IN | WEIGHT: 160 LBS

## 2021-10-05 DIAGNOSIS — Z12.31 VISIT FOR SCREENING MAMMOGRAM: ICD-10-CM

## 2021-10-05 PROCEDURE — 77067 SCR MAMMO BI INCL CAD: CPT

## 2021-10-06 ENCOUNTER — TELEPHONE (OUTPATIENT)
Dept: WOMENS IMAGING | Age: 41
End: 2021-10-06

## 2021-10-08 ENCOUNTER — TELEPHONE (OUTPATIENT)
Dept: WOMENS IMAGING | Age: 41
End: 2021-10-08

## 2021-10-08 NOTE — TELEPHONE ENCOUNTER
Left message to review results of screening mammogram and schedule follow up.   Miguel A Vázquez, RN

## 2021-10-19 ENCOUNTER — HOSPITAL ENCOUNTER (OUTPATIENT)
Dept: WOMENS IMAGING | Age: 41
End: 2021-10-19
Payer: COMMERCIAL

## 2021-10-19 ENCOUNTER — HOSPITAL ENCOUNTER (OUTPATIENT)
Dept: WOMENS IMAGING | Age: 41
Discharge: HOME | End: 2021-10-19
Payer: COMMERCIAL

## 2021-10-19 DIAGNOSIS — R92.8 ABNORMAL MAMMOGRAM: ICD-10-CM

## 2021-10-19 PROCEDURE — G0279 TOMOSYNTHESIS, MAMMO: HCPCS

## 2022-10-05 ENCOUNTER — HOSPITAL ENCOUNTER (OUTPATIENT)
Dept: MAMMOGRAPHY | Age: 42
Discharge: HOME | End: 2022-10-05
Payer: COMMERCIAL

## 2022-10-05 VITALS — BODY MASS INDEX: 25.01 KG/M2 | WEIGHT: 165 LBS | HEIGHT: 68 IN

## 2022-10-05 DIAGNOSIS — Z12.31 VISIT FOR SCREENING MAMMOGRAM: ICD-10-CM

## 2022-10-05 PROCEDURE — 77063 BREAST TOMOSYNTHESIS BI: CPT

## (undated) DEVICE — SET FLD MGMT CTRL SYS INFLO TB AQUILEX

## (undated) DEVICE — TRAY PREP DRY W/ PREM GLV 2 APPL 6 SPNG 2 UNDPD 1 OVERWRAP

## (undated) DEVICE — GOWN,AURORA,NONREINF,RAGLAN,XXL,STERILE: Brand: MEDLINE

## (undated) DEVICE — SET FLD MGMT OUTFLO TB DISP FOR CTRL SYS AQUILEX

## (undated) DEVICE — SET ENDOSCP SEAL HYSTEROSCOPE RIG OUTFLO CHN DISP MYOSURE

## (undated) DEVICE — Device: Brand: MEDEX

## (undated) DEVICE — GLOVE ORANGE PI 8   MSG9080

## (undated) DEVICE — SUTURE VCRL + SZ 0 L27IN ABSRB VLT L26MM UR-6 5/8 CIR VCP603H

## (undated) DEVICE — KIT THERMOABLATION 6MM ENDOMET DEV NOVASURE

## (undated) DEVICE — D&C: Brand: MEDLINE INDUSTRIES, INC.

## (undated) DEVICE — SKIN MARKER,REGULAR TIP WITH RULER AND LABELS: Brand: DEVON